# Patient Record
Sex: FEMALE | URBAN - METROPOLITAN AREA
[De-identification: names, ages, dates, MRNs, and addresses within clinical notes are randomized per-mention and may not be internally consistent; named-entity substitution may affect disease eponyms.]

---

## 2020-06-06 ENCOUNTER — HOSPITAL ENCOUNTER (EMERGENCY)
Facility: CLINIC | Age: 55
Discharge: HOME | End: 2020-06-06
Attending: EMERGENCY MEDICINE

## 2020-06-06 NOTE — ED TRIAGE NOTES
"Per EMS, they know patient very well and states that she has a pattern of this behavior where she makes suicidal statements and then when EMS or PD arrive, she denies this and then when she gets to a hospital she complains of \"heart burning or on fire\". Where patient lives, the nurse gave her ativan at 0300 today. Per ems, she has not been sleeping and this is her pattern. Patient is tearful, denies any SI.   "

## 2020-06-06 NOTE — ED TRIAGE NOTES
Pt has various complaints including suicidal ideation of jumping off [employer]. Pt reports she has an infection and wants to stay until she is better.

## 2020-06-06 NOTE — ED NOTES
Bed: ED18  Expected date:   Expected time:   Means of arrival:   Comments:  414  54 F suicidal/no covid  [id number]